# Patient Record
Sex: MALE | Race: WHITE | URBAN - METROPOLITAN AREA
[De-identification: names, ages, dates, MRNs, and addresses within clinical notes are randomized per-mention and may not be internally consistent; named-entity substitution may affect disease eponyms.]

---

## 2019-01-04 ENCOUNTER — OFFICE VISIT (OUTPATIENT)
Dept: URBAN - METROPOLITAN AREA CLINIC 71 | Facility: CLINIC | Age: 84
End: 2019-01-04
Payer: MEDICARE

## 2019-01-04 DIAGNOSIS — H25.13 NUCLEAR SCLEROSIS CATARACT, BILATERAL: Primary | ICD-10-CM

## 2019-01-04 DIAGNOSIS — H35.3221 EXDTVE AGE-REL MCLR DEGN, LEFT EYE, WITH ACTV CHRDL NEOVAS: ICD-10-CM

## 2019-01-04 DIAGNOSIS — H52.4 PRESBYOPIA: ICD-10-CM

## 2019-01-04 PROCEDURE — 92002 INTRM OPH EXAM NEW PATIENT: CPT | Performed by: OPTOMETRIST

## 2019-01-04 ASSESSMENT — VISUAL ACUITY
OS: 20/30+
OD: 20/30+

## 2019-01-04 ASSESSMENT — INTRAOCULAR PRESSURE
OD: 15
OS: 22

## 2019-01-04 NOTE — IMPRESSION/PLAN
Impression: Exdtve age-rel mclr degn, left eye, with actv chrdl neovas: H35.3221. getting injections with Dr. Belen Perrin.  Plan: continue care with Dr. Belen Perrin

## 2019-01-04 NOTE — IMPRESSION/PLAN
Impression: Nuclear sclerosis cataract, bilateral: H25.13. Advanced but pt okay with vision as is. Pt declined dilation since checked by Dr. Cori Handley every 4-6 wks. Plan: Cataracts account for some of the patient's complaints. New glasses/contacts not expected to make significant improvement. Discussed options: surgery vs spectacle change. Patient declines surgery at this time. Ok to schedule a cataract evaluation at patient's request. Need DE if want to consider cataract surgery. Will need clearance from retina also.

## 2019-10-25 ENCOUNTER — Encounter (OUTPATIENT)
Dept: URBAN - METROPOLITAN AREA CLINIC 71 | Facility: CLINIC | Age: 84
End: 2019-10-25
Payer: MEDICARE

## 2019-10-25 PROCEDURE — 92134 CPTRZ OPH DX IMG PST SGM RTA: CPT | Performed by: OPHTHALMOLOGY

## 2019-10-25 PROCEDURE — 99214 OFFICE O/P EST MOD 30 MIN: CPT | Performed by: OPHTHALMOLOGY

## 2019-10-30 ENCOUNTER — PRE-OPERATIVE VISIT (OUTPATIENT)
Dept: URBAN - METROPOLITAN AREA CLINIC 71 | Facility: CLINIC | Age: 84
End: 2019-10-30
Payer: MEDICARE

## 2019-10-30 PROCEDURE — 92014 COMPRE OPH EXAM EST PT 1/>: CPT | Performed by: OPHTHALMOLOGY

## 2019-10-30 PROCEDURE — 92136 OPHTHALMIC BIOMETRY: CPT | Performed by: OPHTHALMOLOGY

## 2019-10-30 PROCEDURE — 92250 FUNDUS PHOTOGRAPHY W/I&R: CPT | Performed by: OPHTHALMOLOGY

## 2022-02-28 ENCOUNTER — OFFICE VISIT (OUTPATIENT)
Dept: CARDIOLOGY | Facility: CLINIC | Age: 87
End: 2022-02-28

## 2022-02-28 VITALS
BODY MASS INDEX: 30.06 KG/M2 | HEIGHT: 70 IN | SYSTOLIC BLOOD PRESSURE: 122 MMHG | WEIGHT: 210 LBS | DIASTOLIC BLOOD PRESSURE: 68 MMHG | HEART RATE: 60 BPM

## 2022-02-28 DIAGNOSIS — I48.0 PAROXYSMAL ATRIAL FIBRILLATION: Primary | ICD-10-CM

## 2022-02-28 PROCEDURE — 93000 ELECTROCARDIOGRAM COMPLETE: CPT | Performed by: INTERNAL MEDICINE

## 2022-02-28 PROCEDURE — 99204 OFFICE O/P NEW MOD 45 MIN: CPT | Performed by: INTERNAL MEDICINE

## 2022-02-28 RX ORDER — APIXABAN 5 MG/1
5 TABLET, FILM COATED ORAL 2 TIMES DAILY
COMMUNITY
Start: 2022-02-19 | End: 2022-02-28

## 2022-02-28 RX ORDER — UBIDECARENONE 100 MG
100 CAPSULE ORAL DAILY
COMMUNITY

## 2022-02-28 RX ORDER — MULTIPLE VITAMINS W/ MINERALS TAB 9MG-400MCG
1 TAB ORAL DAILY
COMMUNITY

## 2022-02-28 RX ORDER — APIXABAN 5 MG/1
5 TABLET, FILM COATED ORAL 2 TIMES DAILY
Qty: 180 TABLET | Refills: 3 | Status: SHIPPED | OUTPATIENT
Start: 2022-02-28

## 2022-02-28 RX ORDER — DILTIAZEM HYDROCHLORIDE 120 MG/1
CAPSULE, COATED, EXTENDED RELEASE ORAL
COMMUNITY
Start: 2022-02-11

## 2022-02-28 RX ORDER — ASCORBIC ACID 1000 MG
TABLET ORAL
COMMUNITY

## 2022-02-28 RX ORDER — SIMVASTATIN 20 MG
20 TABLET ORAL DAILY
COMMUNITY

## 2022-02-28 RX ORDER — CYANOCOBALAMIN (VITAMIN B-12) 1000 MCG
TABLET ORAL
COMMUNITY

## 2022-03-06 NOTE — PROGRESS NOTES
Date of Office Visit:  2022  Encounter Provider: Korey Lancaster MD  Place of Service: [facility] CARDIOLOGY  Patient Name: Lee Chavis  :1935    Chief complaint: Paroxysmal atrial fibrillation.    History of Present Illness:    I had the pleasure of seeing the patient in cardiology office on 2022.  He is a very pleasant 86 year-old male with a history of paroxysmal atrial fibrillation, hypertension, and hyperlipidemia who presents to [facility] care.  The patient has formerly followed with Dr. Abel Garcia, but will be switching care to me today.    The patient has a history of paroxysmal atrial fibrillation.  This was originally diagnosed in [place] in approximately 2019.  He is symptomatic when this occurs, and typically feels palpitations.  He did tell me that he is uncomfortable when he is in atrial fibrillation.  He has been maintained on Cardizem CD with as needed diltiazem for breakthrough episodes.  He has been taking Eliquis.  He has only been taking the Eliquis 5 mg in the morning and 2.5 mg in the evening, although he recently went back to the 5 mg twice daily dose.  He has not had any bleeding issues.  He denied any chest pain or shortness of breath.  He did have an echocardiogram on 3/12/2021 which showed an ejection fraction of 55 to 60%, mild left atrial enlargement, and a positive bubble study consistent with a PFO.    Past Medical History:   Diagnosis Date   • Mixed hyperlipidemia     stated in 2021 Dr. Abel Garcia office note   • PAF (paroxysmal atrial fibrillation) (HCC)     stated in 2021 Dr. Abel Garcia office note       Past Surgical History:   Procedure Laterality Date   • HERNIA REPAIR         Current Outpatient Medications on File Prior to Visit   Medication Sig Dispense Refill   • Calcium Carbonate-Vit D-Min (CALCIUM 1200 PO) Take  by mouth.     • Cholecalciferol (D3 VITAMIN PO) Take  by mouth.     • COCONUT OIL PO Take  by  "mouth.     • coenzyme Q10 100 MG capsule Take 100 mg by mouth Daily.     • Cyanocobalamin (B-12) 1000 MCG tablet Take  by mouth.     • dilTIAZem CD (CARDIZEM CD) 120 MG 24 hr capsule TAKE 1 CAPSULE BY MOUTH DAILY. MAY TAKE EXTRA CAPSULE IF / AS NEEDED     • Ginkgo Biloba 40 MG tablet Take  by mouth.     • Glucosamine-Chondroitin (MOVE FREE PO) Take  by mouth.     • multivitamin with minerals (PRESERVISION AREDS PO) Take 1 tablet by mouth Daily.     • simvastatin (ZOCOR) 20 MG tablet Take 20 mg by mouth Daily.       No current facility-administered medications on file prior to visit.     Allergies as of 02/28/2022   • (No Known Allergies)     Social History     Socioeconomic History   • Marital status:    Tobacco Use   • Smoking status: Never Smoker   • Smokeless tobacco: Never Used   Substance and Sexual Activity   • Alcohol use: Never   • Drug use: Never     Family History   Problem Relation Age of Onset   • Diabetes Father        Review of Systems   Constitutional: Positive for malaise/fatigue.   Cardiovascular: Positive for palpitations.   Musculoskeletal: Positive for joint pain.   All other systems reviewed and are negative.     Objective:     Vitals:    02/28/22 0926   BP: 122/68   Pulse: 60   Weight: 95.3 kg (210 lb)   Height: 177.8 cm (70\")     Body mass index is 30.13 kg/m².    Constitutional:       Appearance: Healthy appearance. Well-developed.   Eyes:      Conjunctiva/sclera: Conjunctivae normal.   HENT:      Head: Normocephalic and atraumatic.   Pulmonary:      Effort: Pulmonary effort is normal.      Breath sounds: Normal breath sounds.   Cardiovascular:      Normal rate. Regular rhythm.      Murmurs: There is no murmur.      No gallop.   Edema:     Peripheral edema absent.   Abdominal:      Palpations: Abdomen is soft.      Tenderness: There is no abdominal tenderness.   Musculoskeletal:      Cervical back: Neck supple. Skin:     General: Skin is warm.   Neurological:      Mental Status: Alert " and oriented to person, place, and time.   Psychiatric:         Behavior: Behavior normal.       Lab Review:     ECG 12 Lead    Date/Time: 2/28/2022 9:41 AM  Performed by: Korey Lancaster MD  Authorized by: Korey Lancaster MD   Comparison: not compared with previous ECG   Previous ECG: no previous ECG available  Rhythm: sinus rhythm  Rate: normal  BPM: 60  Conduction: 1st degree AV block    Clinical impression: abnormal EKG            Cardiac Procedures:  1.  Lexiscan Myoview stress test in December 2019: Normal with no ischemia.  2.  Echocardiogram on 3/12/2021: The ejection fraction was 55 to 60%.  There was mild left atrial enlargement.  There was mild tricuspid regurgitation.  The atrial septum was mobile.  Bubble study was positive.    Assessment:       Diagnosis Plan   1. Paroxysmal atrial fibrillation (HCC)       Plan:       The patient seems to be doing fairly well.  He is symptomatic when the atrial fibrillation occurs.  I reassured him that he will not have a heart attack and will not die from this.  He was concerned about these 2 issues.  He is now back taking the Eliquis 5 mg twice a day.  He had only been taking 2.5 mg at night.  However, I reassured him that the 5 mg twice a day is the correct dose based on his renal function and size.  He is maintained on Cardizem CD at 120 mg/day, and seems to be doing fairly well with this.  When he does have a breakthrough episode, he takes diltiazem 30 mg as needed.  His regimen can be adjusted if needed in the future, although I am going to keep him on this for now.  He had an echocardiogram approximately 1 year ago on 3/12/2021 which showed a PFO and only mild left atrial enlargement.  He is already anticoagulated, and the PFO should pose no issues for him.  I will plan on seeing him back in the office in 6 months at [facility] unless other issues arise.

## 2022-09-06 PROBLEM — I48.91 A-FIB: Status: ACTIVE | Noted: 2022-09-06

## 2022-09-06 PROBLEM — E78.5 HYPERLIPIDEMIA: Status: ACTIVE | Noted: 2022-09-06
